# Patient Record
Sex: MALE | Race: WHITE | NOT HISPANIC OR LATINO
[De-identification: names, ages, dates, MRNs, and addresses within clinical notes are randomized per-mention and may not be internally consistent; named-entity substitution may affect disease eponyms.]

---

## 2023-03-21 ENCOUNTER — DASHBOARD ENCOUNTERS (OUTPATIENT)
Age: 23
End: 2023-03-21

## 2023-03-21 ENCOUNTER — OFFICE VISIT (OUTPATIENT)
Dept: URBAN - METROPOLITAN AREA CLINIC 78 | Facility: CLINIC | Age: 23
End: 2023-03-21
Payer: COMMERCIAL

## 2023-03-21 VITALS
SYSTOLIC BLOOD PRESSURE: 130 MMHG | DIASTOLIC BLOOD PRESSURE: 80 MMHG | BODY MASS INDEX: 25.73 KG/M2 | TEMPERATURE: 98.3 F | HEIGHT: 72 IN | HEART RATE: 67 BPM | WEIGHT: 190 LBS | RESPIRATION RATE: 16 BRPM

## 2023-03-21 DIAGNOSIS — K59.00 CONSTIPATION, UNSPECIFIED CONSTIPATION TYPE: ICD-10-CM

## 2023-03-21 DIAGNOSIS — R12 HEARTBURN: ICD-10-CM

## 2023-03-21 DIAGNOSIS — R74.8 ELEVATED LIVER ENZYMES: ICD-10-CM

## 2023-03-21 DIAGNOSIS — K62.5 RECTAL BLEEDING: ICD-10-CM

## 2023-03-21 PROBLEM — 79890006: Status: ACTIVE | Noted: 2023-03-21

## 2023-03-21 PROBLEM — 14760008: Status: ACTIVE | Noted: 2023-03-21

## 2023-03-21 PROCEDURE — 99204 OFFICE O/P NEW MOD 45 MIN: CPT | Performed by: INTERNAL MEDICINE

## 2023-03-21 PROCEDURE — 99244 OFF/OP CNSLTJ NEW/EST MOD 40: CPT | Performed by: INTERNAL MEDICINE

## 2023-03-21 RX ORDER — SODIUM PICOSULFATE, MAGNESIUM OXIDE, AND ANHYDROUS CITRIC ACID 10; 3.5; 12 MG/160ML; G/160ML; G/160ML
160ML AS DIRECTED LIQUID ORAL ONCE
Qty: 1 | Refills: 0 | OUTPATIENT
Start: 2023-03-21 | End: 2023-03-22

## 2023-03-21 NOTE — HPI-TODAY'S VISIT:
The patient was referred to us by Dr. Jolynn Lomas for rectal bleeding. A copy of this note will be sent to the referring physician.   The patient reports a history of rectal bleeding present for the past 1 year. He states he has lower abdominal pain.   He suffers from "diarrhea" in the morning (he has ~ 3 BMs daily). He tends to have a sense of incomplete evacuation though. He has anorexia and admits to a 15 lbs unintentional weight loss.   He feels  nauseous all day. He has occasional vomiting. He feels weak and dizzy most days. He experiences heartburn once or twice a weeek.   The patient has no pertinent additional complaints of abdominal pain, bloating or rectal pain.  Regarding any upper GI complaints, the patient has not had dysphagia.   He had recent bloodwork via his PCP. He was told he had elevated liver enzymes.   The patient does not take blood thinners.  They deny any CP or GALICIA.  The patient has never had a colonoscopy previously. There is no FH of colon cancer, both parents and his GF have had colon polyps. No FH of IBD.  He is drinking 2-3 beers/day. He denies IVDA. No snorting cocane. No history of blood transfusions.   He just graduated college.   Summary of prior workup: - Labs on 3/18/2023: Total protein 7.3, albumin 5.3, total bilirubin 0.4, alkaline phosphatase 51, AST 44, , hepatitis panel positive for hepatitis A antibody total but negative IgM.  HBsAg negative.  HBsAb positive.  HCV antibody negative.  CRP less than 1, ESR 2. -Labs: 3/4/2023: Glucose 81, BUN 10, creatinine 0.8, sodium 140, potassium 4.5, calcium 9.8, total protein 7.1, albumin 5.2, total bilirubin 0.3, alkaline phosphatase 54, AST 42, ALT 67.  WBC 6.9, hemoglobin 16.9, MCV 92, platelets 200.  TSH 1.29.  Free T41.3

## 2023-03-21 NOTE — PHYSICAL EXAM NECK/THYROID:
normal appearance , without tenderness upon palpation , no deformities , trachea midline , Thyroid normal size , no masses , thyroid nontender
(1) Other Medications/None

## 2023-03-28 ENCOUNTER — TELEPHONE ENCOUNTER (OUTPATIENT)
Dept: URBAN - METROPOLITAN AREA CLINIC 78 | Facility: CLINIC | Age: 23
End: 2023-03-28

## 2023-03-28 ENCOUNTER — WEB ENCOUNTER (OUTPATIENT)
Dept: URBAN - METROPOLITAN AREA CLINIC 96 | Facility: CLINIC | Age: 23
End: 2023-03-28

## 2023-03-28 RX ORDER — SODIUM PICOSULFATE, MAGNESIUM OXIDE, AND ANHYDROUS CITRIC ACID 10; 3.5; 12 MG/160ML; G/160ML; G/160ML
AS DIRECTED LIQUID ORAL
OUTPATIENT
Start: 2023-03-28

## 2023-03-29 ENCOUNTER — OFFICE VISIT (OUTPATIENT)
Dept: URBAN - METROPOLITAN AREA SURGERY CENTER 15 | Facility: SURGERY CENTER | Age: 23
End: 2023-03-29
Payer: COMMERCIAL

## 2023-03-29 DIAGNOSIS — R19.4 ALTERATION IN BOWEL ELIMINATION: ICD-10-CM

## 2023-03-29 DIAGNOSIS — K62.5 ANAL BLEEDING: ICD-10-CM

## 2023-03-29 PROCEDURE — G8907 PT DOC NO EVENTS ON DISCHARG: HCPCS | Performed by: INTERNAL MEDICINE

## 2023-03-29 PROCEDURE — 45378 DIAGNOSTIC COLONOSCOPY: CPT | Performed by: INTERNAL MEDICINE

## 2023-03-29 RX ORDER — SODIUM PICOSULFATE, MAGNESIUM OXIDE, AND ANHYDROUS CITRIC ACID 10; 3.5; 12 MG/160ML; G/160ML; G/160ML
AS DIRECTED LIQUID ORAL
Status: ACTIVE | COMMUNITY
Start: 2023-03-28